# Patient Record
(demographics unavailable — no encounter records)

---

## 2017-05-18 NOTE — EDM.PDOC
ED HPI GENERAL MEDICAL PROBLEM





- General


Chief Complaint: Allergic Reaction


Stated Complaint: POSS REACTION TO CHEMO


Time Seen by Provider: 05/18/17 21:37


Source of Information: Reports: Patient, Family (), RN Notes Reviewed


History Limitations: Reports: No Limitations





- History of Present Illness


INITIAL COMMENTS - FREE TEXT/NARRATIVE: 





The patient states that she self-injects methotrexate weekly for treatment of 

her psoriasis, since February 2017, prescribed per Dr. Putnam from [facility].

  Today around 20:00 she injected her methotrexate in her right inner thigh, 

using a needle about 1.5 inches in length.  Shortly thereafter, she developed a 

burning sensation at the injection site which has since spread down to just 

below her right knee, and she has also noticed a yellow discoloration 

immediately adjacent to the injection site.  No prior similar reactions.





The patient denies having any other problems, including rash, pruritus, dyspnea

, shortness of breath, or angioedema.


  ** Right Upper Leg


Pain Score (Numeric/FACES): 5





- Related Data


 Allergies











Allergy/AdvReac Type Severity Reaction Status Date / Time


 


azathioprine Allergy  Nausea and Verified 05/18/17 21:17





   Vomiting  


 


nickel Allergy  Rash Verified 05/18/17 21:17











Home Meds: 


 Home Meds





FLUoxetine [PROzac] 20 mg PO DAILY 02/09/16 [History]


Acetaminophen/HYDROcodone [Lortab 500-5 MG] 1 tab PO Q6H #20 tablet 04/05/16 [Rx

]


Dicyclomine HCl [Bentyl] 20 mg PO Q6HR #20 tablet 04/05/16 [Rx]


Mesalamine [Lialda] 4 tab PO DAILY 04/05/16 [History]


Acetaminophen/HYDROcodone [Norco 325-5 MG] 1 - 2 tab PO Q6H PRN #15 tablet 11/01 /16 [Rx]


Prednisone [IMW: Prednisone] 30 mg PO DAILY 11/01/16 [History]











Past Medical History


HEENT History: Reports: Impaired Vision


Other HEENT History: wears glasses


Gastrointestinal History: Reports: Inflammatory Bowel Disease (Crohn disease 

and ulcerative colitis)


OB/GYN History: Reports: Pregnancy


Other OB/BYN History: pre menopausal dysphoric disorder, breast mass, 

dysmenorrhea, menorrhagia, STD


Neurological History: Reports: Migraines, Seizure (x 1)


Psychiatric History: Reports: Mood Swings


Endocrine/Metabolic History: Reports: Obesity/BMI 30+


Immunologic History: Reports: Other (See Below) (Psoriasis)





- Past Surgical History


GI Surgical History: Reports: Appendectomy, Colonoscopy, EGD


Female  Surgical History: Reports: Hysterectomy





Social & Family History





- Tobacco Use


Smoking Status *Q: Current Every Day Smoker


Years of Tobacco use: 15


Packs/Tins Daily: 0.5


Second Hand Smoke Exposure: Yes





- Caffeine Use


Caffeine Use: Reports: Coffee





- Alcohol Use


Alcohol Use History: Yes


Alcohol Use Frequency: Socially





- Recreational Drug Use


Recreational Drug Use: Yes


Drug Use in Last 12 Months: Yes


Recreational Drug Type: Reports: Marijuana/Hashish


Recreational Drug Use Frequency: Socially





- Living Situation & Occupation


Living situation: Reports: , with Spouse, with Family (2 kids)


Occupation: Employed (Owns a business)





ED ROS ALLERGIC REACTION





- Review of Systems


Review Of Systems: See Below


Constitutional: Reports: No Symptoms


HEENT: Reports: No Symptoms


Respiratory: Reports: No Symptoms


Cardiovascular: Reports: No Symptoms


Endocrine: Reports: No Symptoms


GI/Abdominal: Reports: No Symptoms


: Reports: No Symptoms


Musculoskeletal: Reports: No Symptoms


Skin: Reports: No Symptoms


Neurological: Reports: No Symptoms


Psychiatric: Reports: No Symptoms


Hematologic/Lymphatic: Reports: No Symptoms


Immunologic: Reports: No Symptoms





ED EXAM GENERAL NO PERIP PULSE





- Physical Exam


Exam: See Below


Exam Limited By: No Limitations


General Appearance: Alert, WD/WN, No Apparent Distress


Eye Exam: Bilateral Eye: Normal Inspection


Ears: Normal External Exam, Hearing Grossly Normal


Nose: Normal Inspection, No Blood


Throat/Mouth: Normal Inspection, Normal Lips, Normal Voice, No Airway Compromise


Head: Atraumatic, Normocephalic


Neck: Normal Inspection, Full Range of Motion


Respiratory/Chest: No Respiratory Distress, Lungs Clear, Normal Breath Sounds, 

No Accessory Muscle Use


Cardiovascular: Normal Peripheral Pulses, Regular Rate, Rhythm, No Gallop, No 

JVD, No Murmur, No Rub


GI/Abdominal: Normal Bowel Sounds, Soft, Non-Tender, No Organomegaly, No 

Distention, No Abnormal Bruit, No Mass


 (Female) Exam: Deferred


Rectal (Female) Exam: Deferred


Back Exam: Normal Inspection, Full Range of Motion, NT


Extremities: Normal Inspection, Normal Range of Motion, No Pedal Edema, Normal 

Capillary Refill, Other (Approximately 2 cm patch of yellow skin on the right 

inner 5 adjacent to a visible injection site.  Nonpalpable.)


Neurological: Alert, Oriented, Normal Cognition, No Motor/Sensory Deficits


Psychiatric: Normal Affect


Skin Exam: Warm, Dry, Intact, Normal Color, No Rash


Lymphatic: No Adenopathy





Course





- Vital Signs


Last Recorded V/S: 


 Last Vital Signs











Temp  36.7 C   05/18/17 21:18


 


Pulse  71   05/18/17 21:18


 


Resp  16   05/18/17 21:18


 


BP  123/71   05/18/17 21:18


 


Pulse Ox  97   05/18/17 21:18














- Re-Assessments/Exams


Free Text/Narrative Re-Assessment/Exam: 





05/18/17 22:24


The patient states that she has a burning sensation to the injection site on 

her right inner thigh that is spreading, as well as a yellow discoloration to 

the injection site.  The medical literature indicates a 3-10% incidence of a 

burning sensation to the injection site her methotrexate, and a less than 1% 

hyperpigmentation, however, I don't believe that the yellowish discoloration is 

hyperpigmentation.  The patient points out, however, that the methotrexate 

itself is yellow, so perhaps this is some skin infiltration.





I find the patient that there is no way that we can remove the methotrexate, 

and I don't believe any treatment is required, as this is not allergic 

reaction.  I would have the patient followup with her prescribing physician, 

Dr. Putnam, in the morning.





Departure





- Departure


Time of Disposition: 22:25


Disposition: Home, Self-Care 01


Condition: good


Clinical Impression: 


 Adverse reaction to antineoplastic and immunosuppressive drugs








- Discharge Information


Instructions:  Drug Allergy, Easy-to-Read


Forms:  ED Department Discharge


Additional Instructions: 


You were seen in the emergency room for a burning sensation to your right inner 

thigh, spreading, and a yellowish discoloration to the injection site of your 

methotrexate.





The literature indicates that a burning sensation can occur in 3-10% of people 

who inject methotrexate.





The literature indicates that less than 1% of people can have hyperpigmentation

, although it is more likely that the yellowish discoloration at your injection 

site is infiltration of the methotrexate into your skin.





These reactions are considered "adverse reactions", not allergic reactions.  No 

medical treatment is required.





We recommend that you notify Dr. Putnam of your experience, in the morning.





If any other problems, please do not hesitate to return to the ER.

## 2018-01-02 NOTE — EDM.PDOC
ED HPI GENERAL MEDICAL PROBLEM





- General


Chief Complaint: Gastrointestinal Problem


Stated Complaint: CROHN'S ISSUES


Time Seen by Provider: 01/02/18 15:12


Source of Information: Reports: Patient


History Limitations: Reports: No Limitations





- History of Present Illness


INITIAL COMMENTS - FREE TEXT/NARRATIVE: 


Patient is a 33-year-old female with a history of Crohn's disease and also 

ulcerative colitis. States she had had a flare of her Crohn's this past Friday 

with heavy bleeding from her rectum. Bleeding has lessened since initial onset. 

She's had one bowel movement today with faint blood present. Of course of 

frequency of defecation is worsen with eating. She's had low appetite since 

onset. She does receive chemotherapy shots weekly along with methotrexate and 

Humira. 


  ** left abdomen/right groin


Pain Score (Numeric/FACES): 5





- Related Data


 Allergies











Allergy/AdvReac Type Severity Reaction Status Date / Time


 


azathioprine Allergy  Nausea and Verified 01/02/18 15:03





   Vomiting  


 


nickel Allergy  Rash Verified 01/02/18 15:03











Home Meds: 


 Home Meds





FLUoxetine [PROzac] 20 mg PO DAILY 02/09/16 [History]


Acetaminophen/HYDROcodone [Norco 325-5 MG] 1 tab PO Q6H PRN #12 tablet 01/02/18 

[Rx]


Adalimumab [Humira Crohn's] 40 mg SQ ASDIRECTED 01/02/18 [History]


Flowbee 1 tab PO ASDIRECTED 01/02/18 [History]


Methotrexate Sodium [Methotrexate] 250 mg SQ WEEKLY 01/02/18 [History]











Past Medical History


HEENT History: Reports: Impaired Vision


Other HEENT History: wears glasses


Respiratory History: Reports: Asthma


Gastrointestinal History: Reports: Inflammatory Bowel Disease, Other (See Below)


Other Gastrointestinal History: chrohn's disease


OB/GYN History: Reports: Pregnancy


Other OB/BYN History: pre menopausal dysphoric disorder, breast mass, 

dysmenorrhea, menorrhagia, STD


Neurological History: Reports: Migraines, Seizure


Psychiatric History: Reports: Mood Swings


Other Psychiatric History: fatigue


Endocrine/Metabolic History: Reports: Obesity/BMI 30+


Immunologic History: Reports: Other (See Below)


Other Immunologic History: is on chem drugs for chrohn's





- Past Surgical History


GI Surgical History: Reports: Appendectomy, Colonoscopy, EGD


Female  Surgical History: Reports: Hysterectomy





Social & Family History





- Family History


Family Medical History: Noncontributory





- Tobacco Use


Smoking Status *Q: Current Every Day Smoker


Years of Tobacco use: 15


Packs/Tins Daily: 0.5


Second Hand Smoke Exposure: Yes





- Caffeine Use


Caffeine Use: Reports: None





- Alcohol Use


Days Per Week of Alcohol Use: 0


Number of Drinks Per Day: 0


Total Drinks Per Week: 0





- Recreational Drug Use


Recreational Drug Use: Yes


Drug Use in Last 12 Months: Yes


Recreational Drug Type: Reports: Marijuana/Hashish


Recreational Drug Use Frequency: Daily





- Living Situation & Occupation


Living situation: Reports: , with Spouse, with Family (2 kids)


Occupation: Employed (Owns a business)





ED ROS GENERAL





- Review of Systems


Review Of Systems: See Below


Constitutional: Reports: Decreased Appetite.  Denies: Fever, Chills


HEENT: Reports: No Symptoms


Respiratory: Reports: No Symptoms


Cardiovascular: Reports: No Symptoms


GI/Abdominal: Reports: Abdominal Pain, Bloody Stool, Constipation, Diarrhea, 

Decreased Appetite.  Denies: Hematemesis, Nausea, Vomiting


: Reports: No Symptoms


Musculoskeletal: Reports: No Symptoms


Neurological: Reports: No Symptoms





ED EXAM, GI/ABD





- Physical Exam


Exam: See Below


Exam Limited By: No Limitations


General Appearance: Alert, WD/WN, No Apparent Distress


Ears: Hearing Grossly Normal


Nose: Normal Inspection


Throat/Mouth: Normal Voice, No Airway Compromise


Head: Atraumatic, Normocephalic


Neck: Normal Inspection, Supple


Respiratory/Chest: No Respiratory Distress, Lungs Clear, Normal Breath Sounds, 

No Accessory Muscle Use, Chest Non-Tender


Cardiovascular: Normal Peripheral Pulses, Regular Rate, Rhythm


GI/Abdominal Exam: Normal Bowel Sounds, Soft, No Organomegaly, Distended, 

Tender (Throughout)


Rectal (Female) Exam: Deferred


Back Exam: Normal Inspection.  No: CVA Tenderness (L), CVA Tenderness (R)


Neurological: Alert, Oriented, CN II-XII Intact, Normal Cognition, No Motor/

Sensory Deficits


Psychiatric: Normal Affect, Normal Mood


Skin Exam: Warm, Dry, Intact, Normal Color, No Rash





Course





- Vital Signs


Last Recorded V/S: 


 Last Vital Signs











Temp  97.5 F   01/02/18 14:52


 


Pulse  57 L  01/02/18 18:30


 


Resp  16   01/02/18 18:30


 


BP  107/72   01/02/18 18:30


 


Pulse Ox  96   01/02/18 18:30














- Orders/Labs/Meds


Orders: 


 Active Orders 24 hr











 Category Date Time Status


 


 Peripheral IV Care [RC] .AS DIRECTED Care  01/02/18 15:28 Active


 


 Peripheral IV Insertion Adult [OM.PC] Stat Oth  01/02/18 15:27 Ordered











Labs: 


 Laboratory Tests











  01/02/18 01/02/18 01/02/18 Range/Units





  16:00 16:00 17:15 


 


WBC  6.66    (3.98-10.04)  K/mm3


 


RBC  4.22    (3.98-5.22)  M/mm3


 


Hgb  13.3    (11.2-15.7)  gm/L


 


Hct  38.8    (34.1-44.9)  %


 


MCV  91.9    (79.4-94.8)  fl


 


MCH  31.5    (25.6-32.2)  pg


 


MCHC  34.3    (32.2-35.5)  g/dl


 


RDW Std Deviation  44.3    (36.4-46.3)  fL


 


Plt Count  181 L    (182-369)  K/mm3


 


MPV  10.6    (9.4-12.3)  fl


 


Neut % (Auto)  52.0    (34.0-71.1)  %


 


Lymph % (Auto)  39.6    (19.3-51.7)  %


 


Mono % (Auto)  6.5    (4.7-12.5)  %


 


Eos % (Auto)  1.5    (0.7-5.8)  


 


Baso % (Auto)  0.2    (0.1-1.2)  %


 


Neut # (Auto)  3.47    (1.56-6.13)  K/mm3


 


Lymph # (Auto)  2.64    (1.18-3.74)  K/mm3


 


Mono # (Auto)  0.43 H    (0.24-0.36)  K/mm3


 


Eos # (Auto)  0.10    (0.04-0.36)  K/mm3


 


Baso # (Auto)  0.01    (0.01-0.08)  K/mm3


 


Sodium   139   (136-145)  mEq/L


 


Potassium   4.1   (3.5-5.1)  mEq/L


 


Chloride   106   ()  mEq/L


 


Carbon Dioxide   28   (21-32)  mEq/L


 


Anion Gap   9.1   (5-15)  


 


BUN   10   (7-18)  mg/dL


 


Creatinine   0.7   (0.55-1.02)  mg/dL


 


Est Cr Clr Drug Dosing   111.16   mL/min


 


Estimated GFR (MDRD)   > 60   (>60)  mL/min


 


BUN/Creatinine Ratio   14.3   (14-18)  


 


Glucose   88   ()  mg/dL


 


Calcium   8.7   (8.5-10.1)  mg/dL


 


Total Bilirubin   0.5   (0.2-1.0)  mg/dL


 


AST   29   (15-37)  U/L


 


ALT   44   (14-59)  U/L


 


Alkaline Phosphatase   36 L   ()  U/L


 


C-Reactive Protein   < 0.2   (<1.0)  mg/dL


 


Total Protein   6.6   (6.4-8.2)  g/dl


 


Albumin   3.6   (3.4-5.0)  g/dl


 


Globulin   3.0   gm/dL


 


Albumin/Globulin Ratio   1.2   (1-2)  


 


Lipase   190   ()  U/L


 


Urine Color    Yellow  (Yellow)  


 


Urine Appearance    Clear  (Clear)  


 


Urine pH    7.0  (5.0-8.0)  


 


Ur Specific Gravity    1.020  (1.005-1.030)  


 


Urine Protein    Negative  (Negative)  


 


Urine Glucose (UA)    Negative  (Negative)  


 


Urine Ketones    Negative  (Negative)  


 


Urine Occult Blood    Negative  (Negative)  


 


Urine Nitrite    Negative  (Negative)  


 


Urine Bilirubin    Negative  (Negative)  


 


Urine Urobilinogen    0.2  (0.2-1.0)  


 


Ur Leukocyte Esterase    Negative  (Negative)  


 


Urine RBC    0-5  (0-5)  /hpf


 


Urine WBC    0-5  (0-5)  /hpf


 


Ur Epithelial Cells    0-5  (0-5)  /hpf


 


Amorphous Sediment    Few H  (NOT SEEN)  /hpf


 


Urine Bacteria    Few  (FEW)  /hpf


 


Urine Mucus    Not seen  (FEW)  /hpf











Meds: 


Medications














Discontinued Medications














Generic Name Dose Route Start Last Admin





  Trade Name Freq  PRN Reason Stop Dose Admin


 


Diatrizoate Meglum/Diatrizoate Sod  90 ml  01/02/18 17:19  01/02/18 17:43





  Gastrografin 37%  PO  01/02/18 17:20  90 ml





  ONETIME ONE   Administration


 


Hydromorphone HCl  1 mg  01/02/18 15:28  01/02/18 16:04





  Dilaudid  IVPUSH  01/02/18 15:29  1 mg





  ONETIME ONE   Administration


 


Hydromorphone HCl  0.5 mg  01/02/18 18:11  01/02/18 18:15





  Dilaudid  IVPUSH  01/02/18 18:12  0.5 mg





  ONETIME ONE   Administration


 


Sodium Chloride  1,000 mls @ 250 mls/hr  01/02/18 15:30  01/02/18 16:06





  Normal Saline  IV   250 mls/hr





  ASDIRECTED MIA   Administration


 


Iopamidol  125 ml  01/02/18 17:19  01/02/18 17:44





  Isovue-300 (61%)  IVPUSH  01/02/18 17:20  125 ml





  ONETIME ONE   Administration


 


Ondansetron HCl  4 mg  01/02/18 15:28  01/02/18 16:02





  Zofran  IVPUSH  01/02/18 15:29  4 mg





  ONETIME ONE   Administration


 


Sodium Chloride  10 ml  01/02/18 15:27  01/02/18 17:44





  Saline Flush  FLUSH   10 ml





  ASDIRECTED PRN   Administration





  Keep Vein Open   














- Re-Assessments/Exams


Free Text/Narrative Re-Assessment/Exam: 








Chest x-ray revealed no acute findings. 





X-ray of the abdomen revealed a few air-fluid levels. Nonspecific stool 

pattern. Due to patient's pain on examination and history of blood in her stool 

with ulcerative and Crohn's disease. . Will go ahead and obtain CT of the 

abdomen and pelvis with oral and IV contrast.Patient request CT of the abdomen 

and pelvis





Labs pending. 





Labs reviewed: CBC essentially normal. Chemistry panel essentially normal. CRP 

less than 0.2. Lipase 190. UA negative for infection. 





CT abdomen and pelvis with IV contrast impression: No acute findings.





Discuss results of labs and also CT study with patient. Pain was under control 

but coming back now. Will order Dilaudid 0.5 mg IVP.  





Will discharge home with instructions as documented.











Departure





- Departure


Time of Disposition: 18:11


Disposition: Home, Self-Care 01


Condition: Good


Clinical Impression: 


 Abdominal pain, Blood per rectum





Crohns disease


Qualifiers:


 Gastrointestinal tract location: unspecified location Digestive disease 

complication type: unspecified complication Qualified Code(s): K50.919 - Crohn'

s disease, unspecified, with unspecified complications





Ulcerative (chronic) enterocolitis


Qualifiers:


 Digestive disease complication type: unspecified complication Qualified Code(s)

: K51.019 - Ulcerative (chronic) pancolitis with unspecified complications








- Discharge Information


Prescriptions: 


Acetaminophen/HYDROcodone [Norco 325-5 MG] 1 tab PO Q6H PRN #12 tablet


 PRN Reason: Pain (Severe 7-10)


Instructions:  Abdominal Pain, Adult, Easy-to-Read


Referrals: 


Kristen Allen NP [Primary Care Provider] - 


Forms:  ED Department Discharge, ED Return to Work/School Form


Additional Instructions: 


As discussed CT of the abdomen and pelvis along with labs did not reveal any 

concerning findings. For pain take Norco one tab every 6 hours as needed for 

severe pain. Push the fluids. Eat a low residue diet. Follow-up with primary 

care provider first part of next week for reevaluation as needed. Return to the 

ED if you develop any new or worsening symptoms.





- My Orders


Last 24 Hours: 


My Active Orders





01/02/18 15:27


Peripheral IV Insertion Adult [OM.PC] Stat 





01/02/18 15:28


Peripheral IV Care [RC] .AS DIRECTED 














- Assessment/Plan


Last 24 Hours: 


My Active Orders





01/02/18 15:27


Peripheral IV Insertion Adult [OM.PC] Stat 





01/02/18 15:28


Peripheral IV Care [RC] .AS DIRECTED

## 2018-01-02 NOTE — CR
Abdominal series: Supine and upright views of the abdomen were 

obtained as well as frontal view of the chest.

 

Comparison: Previous abdominal x-ray of 11/01/16, no previous chest 

x-ray.

 

Heart size and mediastinum are normal.  Lungs are clear.  No free air 

is seen.  Bowel gas pattern appears normal.  Calcifications are seen 

within the pelvis most likely representing phleboliths.  No soft 

tissue abnormality is seen.

 

Impression:

1.  Nonspecific abdominal series.  

 

Diagnostic code #1

## 2018-01-03 NOTE — CT
CT abdomen and pelvis

 

Technique: Multiple axial sections were obtained from above the dome 

of the diaphragm inferiorly through the pubic symphysis.  Intravenous 

and oral contrast has been given.  Delayed images were also obtained 

through the bladder.

 

Comparison: Prior abdominal x-ray of 11/01/16 is available as well as 

previous abdominal and pelvic CT of 12/17/15.

 

Findings: Visualized lung bases show nothing acute.  Liver shows no 

focal parenchymal abnormality.  Gallbladder contains no calcified 

gallstones.  Spleen appears within normal limits.  Soft tissue nodule 

is identified next to the spleen compatible with accessory splenic 

tissue.  Adrenal glands show no nodule.  Pancreas is within normal 

limits.

 

Kidneys show symmetric contrast enhancement without hydronephrosis or 

mass.  No pelvic mass or adenopathy is seen.  Appendix not visualized 

with certainty.  Slight increased stool noted within the right colon. 

 No free fluid or inflammatory change is seen.  No bowel dilatation is

 seen.

 

Delayed images show contrast within the bladder and within the distal 

ureters.

 

Bone window settings were reviewed which appear within normal limits 

for the patient's age.

 

Impression:

1.  Nothing acute is seen on CT study of the abdomen and pelvis.  

 

Diagnostic code #1

 

I agree with preliminary report issued by [facility] ([facility] report finalized 

on 01/02/18, 7:01 PM Central Time)

## 2018-07-13 NOTE — EDM.PDOC
ED HPI GENERAL MEDICAL PROBLEM





- General


Chief Complaint: Abdominal Pain


Stated Complaint: CHRONES FLAREUP


Time Seen by Provider: 07/13/18 15:43


Source of Information: Reports: Patient


History Limitations: Reports: No Limitations





- History of Present Illness


INITIAL COMMENTS - FREE TEXT/NARRATIVE: 





The patient presents with a Crohn's flair.  This stared a few days ago.  She 

has generalized abdominal pain with some bloody diarrhea.  She just found out 

she is allergic to Humera and she is being switched to another biologic August 3rd.  She has no fever, chills, cough, chest pain, or dysuria.  She has nausea 

and no vomiting.  She has never needed surgery yet.


Onset: Gradual


Duration: Day(s):


Location: Reports: Abdomen


Quality: Reports: Ache


Severity: Moderate


Improves with: Reports: None


Worsens with: Reports: None


Associated Symptoms: Reports: Nausea/Vomiting.  Denies: Chest Pain, Cough, Fever

/Chills, Headaches, Shortness of Breath


  ** Abdomen


Pain Score (Numeric/FACES): 4





- Related Data


 Allergies











Allergy/AdvReac Type Severity Reaction Status Date / Time


 


adalimumab [From Humira] Allergy  Itching Verified 07/13/18 15:45


 


azathioprine Allergy  Nausea and Verified 01/02/18 15:03





   Vomiting  


 


nickel Allergy  Rash Verified 01/02/18 15:03











Home Meds: 


 Home Meds





FLUoxetine [PROzac] 20 mg PO DAILY 02/09/16 [History]


Methotrexate 5 tab PO ASDIRECTED 07/13/18 [History]











Past Medical History


HEENT History: Reports: Impaired Vision


Other HEENT History: wears glasses


Respiratory History: Reports: Asthma


Gastrointestinal History: Reports: Inflammatory Bowel Disease, Other (See Below)


Other Gastrointestinal History: chrohn's disease


OB/GYN History: Reports: Pregnancy


Other OB/GYN History: pre menopausal dysphoric disorder, breast mass, 

dysmenorrhea, menorrhagia, STD


Neurological History: Reports: Migraines, Seizure


Psychiatric History: Reports: Mood Swings


Other Psychiatric History: fatigue


Endocrine/Metabolic History: Reports: Obesity/BMI 30+


Immunologic History: Reports: Other (See Below)


Other Immunologic History: is on chem drugs for chrohn's





- Past Surgical History


GI Surgical History: Reports: Appendectomy, Colonoscopy, EGD


Female  Surgical History: Reports: Hysterectomy





Social & Family History





- Family History


Family Medical History: Noncontributory





- Tobacco Use


Smoking Status *Q: Current Every Day Smoker


Years of Tobacco use: 20


Packs/Tins Daily: 0.5





- Caffeine Use


Caffeine Use: Reports: Coffee, Soda





- Recreational Drug Use


Recreational Drug Type: Reports: Marijuana/Hashish





- Living Situation & Occupation


Living situation: Reports: , with Spouse, with Family (2 kids)


Occupation: Employed (Owns a business)





ED ROS GENERAL





- Review of Systems


Review Of Systems: See Below


Constitutional: Reports: No Symptoms


HEENT: Reports: No Symptoms


Respiratory: Reports: No Symptoms


Cardiovascular: Reports: No Symptoms


Endocrine: Reports: No Symptoms


GI/Abdominal: Reports: Abdominal Pain, Bloody Stool, Nausea.  Denies: Vomiting


: Reports: No Symptoms


Musculoskeletal: Reports: No Symptoms


Neurological: Reports: No Symptoms





ED EXAM, GI/ABD





- Physical Exam


Exam: See Below


Exam Limited By: No Limitations


General Appearance: Alert, No Apparent Distress


Ears: Normal External Exam


Nose: Normal Inspection


Head: Atraumatic, Normocephalic


Neck: Normal Inspection


Respiratory/Chest: No Respiratory Distress, Lungs Clear, Normal Breath Sounds


Cardiovascular: Regular Rate, Rhythm, No Edema, No Murmur


GI/Abdominal Exam: Soft, No Organomegaly, No Mass, Tender (Moderate generalized 

tenderness)


Back Exam: Normal Inspection


Extremities: Normal Inspection





Course





- Vital Signs


Last Recorded V/S: 





 Last Vital Signs











Temp  98.5 F   07/13/18 15:52


 


Pulse  62   07/13/18 15:52


 


Resp  20   07/13/18 15:52


 


BP  121/79   07/13/18 15:52


 


Pulse Ox  96   07/13/18 15:52














- Orders/Labs/Meds


Orders: 





 Active Orders 24 hr











 Category Date Time Status


 


 Peripheral IV Care [RC] .AS DIRECTED Care  07/13/18 15:59 Active


 


 Abdomen Series w Chest 1V [CR] Stat Exams  07/13/18 15:59 Taken


 


 UA W/MICROSCOPIC [URIN] Stat Lab  07/13/18 15:59 Ordered


 


 Sodium Chloride 0.9% [Normal Saline] 1,000 ml Med  07/13/18 18:14 Active





 IV ONETIME   


 


 Sodium Chloride 0.9% [Saline Flush] Med  07/13/18 15:59 Active





 10 ml FLUSH ASDIRECTED PRN   


 


 ED Antiemetic Medication Reflex [OM.PC] Stat Oth  07/13/18 15:59 Ordered


 


 Peripheral IV Insertion Adult [OM.PC] Stat Oth  07/13/18 15:59 Ordered








 Medication Orders





Sodium Chloride (Normal Saline)  1,000 mls @ 1,000 mls/hr IV ONETIME ONE


   Stop: 07/13/18 19:13


Sodium Chloride (Saline Flush)  10 ml FLUSH ASDIRECTED PRN


   PRN Reason: Keep Vein Open


   Last Admin: 07/13/18 16:25  Dose: 10 ml








Labs: 





 Laboratory Tests











  07/13/18 07/13/18 07/13/18 Range/Units





  16:25 16:25 16:25 


 


WBC  5.62    (3.98-10.04)  K/mm3


 


RBC  3.86 L    (3.98-5.22)  M/mm3


 


Hgb  11.7    (11.2-15.7)  gm/L


 


Hct  34.6    (34.1-44.9)  %


 


MCV  89.6    (79.4-94.8)  fl


 


MCH  30.3    (25.6-32.2)  pg


 


MCHC  33.8    (32.2-35.5)  g/dl


 


RDW Std Deviation  41.9    (36.4-46.3)  fL


 


Plt Count  204    (182-369)  K/mm3


 


MPV  10.6    (9.4-12.3)  fl


 


Neut % (Auto)  48.0    (34.0-71.1)  %


 


Lymph % (Auto)  40.4    (19.3-51.7)  %


 


Mono % (Auto)  9.8    (4.7-12.5)  %


 


Eos % (Auto)  1.4    (0.7-5.8)  


 


Baso % (Auto)  0.4    (0.1-1.2)  %


 


Neut # (Auto)  2.70    (1.56-6.13)  K/mm3


 


Lymph # (Auto)  2.27    (1.18-3.74)  K/mm3


 


Mono # (Auto)  0.55 H    (0.24-0.36)  K/mm3


 


Eos # (Auto)  0.08    (0.04-0.36)  K/mm3


 


Baso # (Auto)  0.02    (0.01-0.08)  K/mm3


 


Sodium   141   (136-145)  mEq/L


 


Potassium   3.6   (3.5-5.1)  mEq/L


 


Chloride   107   ()  mEq/L


 


Carbon Dioxide   26   (21-32)  mEq/L


 


Anion Gap   11.6   (5-15)  


 


BUN   11   (7-18)  mg/dL


 


Creatinine   0.8   (0.55-1.02)  mg/dL


 


Est Cr Clr Drug Dosing   96.36   mL/min


 


Estimated GFR (MDRD)   > 60   (>60)  mL/min


 


BUN/Creatinine Ratio   13.8 L   (14-18)  


 


Glucose   81   ()  mg/dL


 


Calcium   8.5   (8.5-10.1)  mg/dL


 


Total Bilirubin   0.4   (0.2-1.0)  mg/dL


 


AST   22   (15-37)  U/L


 


ALT   19   (14-59)  U/L


 


Alkaline Phosphatase   34 L   ()  U/L


 


Total Protein   6.6   (6.4-8.2)  g/dl


 


Albumin   3.7   (3.4-5.0)  g/dl


 


Globulin   2.9   gm/dL


 


Albumin/Globulin Ratio   1.3   (1-2)  


 


Lipase   109   ()  U/L


 


HCG, Qual    Negative  (NEGATIVE)  











Meds: 





Medications











Generic Name Dose Route Start Last Admin





  Trade Name Freq  PRN Reason Stop Dose Admin


 


Sodium Chloride  1,000 mls @ 1,000 mls/hr  07/13/18 18:14  





  Normal Saline  IV  07/13/18 19:13  





  ONETIME ONE   





     





     





     





     


 


Sodium Chloride  10 ml  07/13/18 15:59  07/13/18 16:25





  Saline Flush  FLUSH   10 ml





  ASDIRECTED PRN   Administration





  Keep Vein Open   





     





     





     














Discontinued Medications














Generic Name Dose Route Start Last Admin





  Trade Name Freq  PRN Reason Stop Dose Admin


 


Sodium Chloride  1,000 mls @ 1,000 mls/hr  07/13/18 15:59  07/13/18 16:29





  Normal Saline  IV  07/13/18 16:58  1,000 mls/hr





  .BOLUS STA   Administration





     





     





     





     


 


Ondansetron HCl  4 mg  07/13/18 15:59  07/13/18 16:29





  Zofran  IVPUSH  07/13/18 16:00  4 mg





  ONETIME ONE   Administration





     





     





     





     














- Re-Assessments/Exams


Free Text/Narrative Re-Assessment/Exam: 





07/13/18 18:47


I ordered an IV NS 1L bolus, zofran 4mg IV, labs, and an abdominal x-ray.  Her 

CBC and CMP look good.  Her x-ray shows a nonspecific bowel gas pattern.  She 

feels better but she would like another liter of fluids.  I have ordered that 

and I will discharge her home.





Departure





- Departure


Time of Disposition: 18:50


Disposition: Home, Self-Care 01


Condition: Good


Clinical Impression: 


Crohn disease


Qualifiers:


 Gastrointestinal tract location: unspecified location Digestive disease 

complication type: unspecified complication Qualified Code(s): K50.919 - Crohn'

s disease, unspecified, with unspecified complications








- Discharge Information


*PRESCRIPTION DRUG MONITORING PROGRAM REVIEWED*: Not Applicable


*COPY OF PRESCRIPTION DRUG MONITORING REPORT IN PATIENT OSCAR: Not Applicable


Referrals: 


Stephen Mullen MD [Primary Care Provider] - 


Additional Instructions: 


Take your medicine as prescribed. Follow up with Dr Mullen as scheduled.  Drink 

plenty of fluids.  Please return if you are worse.





- My Orders


Last 24 Hours: 





My Active Orders





07/13/18 15:59


Peripheral IV Care [RC] .AS DIRECTED 


Abdomen Series w Chest 1V [CR] Stat 


UA W/MICROSCOPIC [URIN] Stat 


Sodium Chloride 0.9% [Saline Flush]   10 ml FLUSH ASDIRECTED PRN 


ED Antiemetic Medication Reflex [OM.PC] Stat 


Peripheral IV Insertion Adult [OM.PC] Stat 





07/13/18 18:14


Sodium Chloride 0.9% [Normal Saline] 1,000 ml IV ONETIME 














- Assessment/Plan


Last 24 Hours: 





My Active Orders





07/13/18 15:59


Peripheral IV Care [RC] .AS DIRECTED 


Abdomen Series w Chest 1V [CR] Stat 


UA W/MICROSCOPIC [URIN] Stat 


Sodium Chloride 0.9% [Saline Flush]   10 ml FLUSH ASDIRECTED PRN 


ED Antiemetic Medication Reflex [OM.PC] Stat 


Peripheral IV Insertion Adult [OM.PC] Stat 





07/13/18 18:14


Sodium Chloride 0.9% [Normal Saline] 1,000 ml IV ONETIME

## 2018-07-16 NOTE — CR
Abdominal series: Supine and upright views of the abdomen were 

obtained as well as frontal view of the chest.

 

Comparison: Previous abdominal x-ray of previous abdominal series of 

01/02/18.

 

Heart size and mediastinum are normal.  Lungs are clear.  No free air 

is seen.  Bowel gas pattern is normal.  Calcifications are seen within

 the pelvis which are compatible with phleboliths.  No other abnormal 

calcifications are seen.  No soft tissue abnormality is identified.  

Bony structures are unremarkable.

 

Impression:

1.  Unremarkable abdominal series with incidental findings as noted 

above.

 

Diagnostic code #2

## 2019-06-11 NOTE — PCM.OPNOTE
- General Post-Op/Procedure Note


Date of Surgery/Procedure: 06/11/19


Operative Procedure(s): Laparoscopy, right salpingectomy, lysis of pelvic 

adhesions


Findings: 





Right hydrosalpinx. Uterus and cervix surgically absent. Left ovary appeared 

normal however was adhered to the left pelvic sidewall. Right ovary appeared 

normal. The patient's ascending colon was somewhat lightly adherent to the 

right abdominal sidewall.


Pre Op Diagnosis: 1. Right lower quadrant pelvic pain.  2. Right hydrosalpinx


Post-Op Diagnosis: Same with pelvic adhesions in the right abdominal pelvic 

area.


Other Anesthesia Type: Marcaine 0.5%5-10 mL local


Primary Surgeon: Gregg Eldridge


Secondary Surgeon: Ismael Haddad


Anesthesia Provider: Jaymie Loza


Assistant: Araceli Buchanan


Assistant: Deonte Hoffman


Reason Assistant Was Necessary: 





Retraction, assistance, patient safety, quality of care.


Pathology: 





Right fallopian tube


Fluid Replacement, Intraop: 700


Output, Urine Amount: 150


EBL in mLs: 5


Surgical Drain/Tube Type: Nephrostomy


Drain/Tube Comments:: Indwelling bladder catheterduring surgery onlyremoved 

at the end of the case.


Complications: None


Condition: Good


Free Text/Narrative:: 





Surgery duration: 24 minutes








Procedure: The patient was taken to the operating room and placed in supine 

position on the operative table. She had sequential compression stockings in 

place for DVT prophylaxis and had been given 2 g of Ancef IV for infection 

prophylaxis. She was administered general endotracheal anesthesia. After 

administration of anesthesia the patient was placed in dorsal lithotomy 

position and prepped and draped in usual fashion. An indwelling bladder 

catheter was placed. Patient is status post hysterectomy. 





Infraumbilical incision site and suprapubic site were then infiltrated with 

approximately 3-5 mL of Marcaine 0.5%. 5 mm incisions were made in these areas. 

Verres needle was placed in the infraumbilical incision site and 

pneumoperitoneum was established was in 3 L of CO2. The laparoscopic sleeve was 

then placed as was the scope. Under direct visualization the suprapubic site 

was developed with a 5 mm port. Pelvis was evaluated findings as above. Uterus, 

cervix, left fallopian tube and part of the right fallopian tube were found to 

be surgically absent. The right fallopian tube remnant was found to be dilated 

consistent with hydrosalpinx. Ovaries were functional in appearance. Left ovary 

somewhat adherent to left pelvic sidewall. This was not manipulated as patient 

is having no symptoms referable to this area. Patient had adhesions in the area 

of the right area of her appendectomy which was done during her private last 

pregnancy. The liver edge was unremarkable and the gallbladder was visualized 

and found to be without inflammation. Anatomy otherwise was unremarkable.





A third port in the right lower quadrant was then developed under direct 

visualization. Marcaine used for anesthesia. The right fallopian tube was 

elevated and mesosalpinx was then crossclamped and pedicle developed using the 

Enseal vessel closure system. No significant bleeding was noted. At this time 

the procedure was discontinued. Lower 2 port sites removed under direct 

visualization. No peritoneum was reversed and the upper sleeve was removed. No 

bleeding was encountered. Hemostasis was confirmed.





The lower sleeve having been removed under direct visualization. The incisions 

were closed with single subcuticular interrupted suture of 3-0 Monocryl. The 

incisions were further approximated with Dermabond skin glue. The patient was 

awakened from general endotracheal anesthesia. She left the operating room in 

good condition.

## 2019-06-11 NOTE — PCM.POSTAN
POST ANESTHESIA ASSESSMENT





- MENTAL STATUS


Mental Status: Alert





- VITAL SIGNS


Pulse Rate: 97


SaO2: 97 (2LPM nasal cannula)


Resp Rate: 17


Blood Pressure: 119/81


Temperature: 36.8 C





- RESPIRATORY


Respiratory Status: Respiratory Rate WNL, Airway Patent, O2 Saturation Stable, 

Supplemental Oxygen





- CARDIOVASCULAR


CV Status: Pulse Rate WNL, Blood Pressure Stable





- GASTROINTESTINAL


GI Status: No Symptoms





- POST OP HYDRATION


Hydration Status: Adequate & Stable

## 2023-06-13 NOTE — PCM.PREANE
Preanesthetic Assessment





- Anesthesia/Transfusion/Family Hx


Anesthesia History: Prior Anesthesia Without Reaction


Family History of Anesthesia Reaction: No


Transfusion History: No Prior Transfusion(s)


Intubation History: Unknown





- Review of Systems


General: No Symptoms


Pulmonary: No Symptoms (Exercise induced asthma/smoker: less than 1ppd times 15 

years quit smoking in April, but currently vapes and smokes marijuana three 

times a day for nausea, and pain.), Cough, Sputum


Cardiovascular: No Symptoms, Palpitations (on occasion.), Lightheadedness (

positonal changes)


Gastrointestinal: No Symptoms (GERD/History of Crohn's Disease-rectal bleeding)

, Abdominal Pain (2-3/10), Diarrhea


Neurological: No Symptoms (lower back pain noted at 3-4/10), Headache (History 

of migraines), Seizure (History of last one 16 years ago (at the age 18))


Other: Reports: Easy Bruising, Depression, Anxiety





- Physical Assessment


NPO Status Date: 06/10/19


NPO Status Time: 23:00


Pulse: 73


O2 Sat by Pulse Oximetry: 97


Respiratory Rate: 16


Blood Pressure: 114/63


Temperature: 36.6 C


Height: 1.68 m


Weight: 91.626 kg


ASA Class: 2


Mental Status: Alert & Oriented x3


Dentition: Reports: Normal Dentition, Implants (front tooth upper/solid), Caries


Thyro-Mental Finger Breadths: 3


Mouth Opening Finger Breadths: 3


ROM/Head Extension: Full


Lungs: Clear to Auscultation, Normal Respiratory Effort


Cardiovascular: Regular Rate, Regular Rhythm, No Murmurs





- Allergies


Allergies/Adverse Reactions: 


 Allergies











Allergy/AdvReac Type Severity Reaction Status Date / Time


 


adalimumab [From Humira] Allergy  Itching Verified 06/10/19 14:26


 


azathioprine Allergy  Nausea and Verified 06/10/19 14:26





   Vomiting  


 


nickel Allergy  Rash Verified 06/10/19 14:26














- Anesthesia Plan


Pre-Op Medication Ordered: None





- Acknowledgements


Anesthesia Type Planned: General Anesthesia


Pt an Appropriate Candidate for the Planned Anesthesia: Yes


Alternatives and Risks of Anesthesia Discussed w Pt/Guardian: Yes


Pt/Guardian Understands and Agrees with Anesthesia Plan: Yes





PreAnesthesia Questionnaire


HEENT History: Reports: Impaired Vision


Other HEENT History: wears glasses


Cardiovascular History: Reports: None


Respiratory History: Reports: Asthma


Gastrointestinal History: Reports: Inflammatory Bowel Disease, Other (See Below)


Other Gastrointestinal History: crohn's disease, bloating, abdomnial pain, acid 

reflux, diarrhea, LUQ pain, hematochezia, ulcerative colitis


Genitourinary History: Reports: Other (See Below)


Other Genitourinary History: hydrosalpingx, pelvic mass, PMDD, left breast mass

, dysmenorrhea


OB/GYN History: Reports: Pregnancy


Other OB/BYN History: pre menopausal dysphoric disorder, breast mass, 

dysmenorrhea, menorrhagia, STD


Musculoskeletal History: Reports: None


Neurological History: Reports: Migraines, Seizure


Psychiatric History: Reports: Mood Swings


Other Psychiatric History: fatigue


Endocrine/Metabolic History: Reports: Obesity/BMI 30+


Hematologic History: Reports: None


Immunologic History: Reports: Other (See Below)


Other Immunologic History: is on chem drugs for chrohn's


Oncologic (Cancer) History: Reports: None


Dermatologic History: Reports: None





- Past Surgical History


Head Surgeries/Procedures: Reports: None


Cardiovascular Surgical History: Reports: None


Respiratory Surgical History: Reports: None


GI Surgical History: Reports: Appendectomy, Colonoscopy, EGD


Female  Surgical History: Reports: Hysterectomy


Endocrine Surgical History: Reports: None


Neurological Surgical History: Reports: None


Musculoskeletal Surgical History: Reports: None


Oncologic Surgical History: Reports: None


Dermatological Surgical History: Reports: None





- SUBSTANCE USE


Smoking Status *Q: Current Every Day Smoker


Recreational Drug Use History: Yes


Recreational Drug Type: Reports: Marijuana/Hashish





- HOME MEDS


Home Medications: 


 Home Meds





FLUoxetine [PROzac] 20 mg PO DAILY 02/09/16 [History]


Methotrexate 20 mg PO AGOSTO 07/13/18 [History]


Ustekinumab [Stelara] 90 mg SQ Q56D 09/08/18 [History]


Albuterol [Ventolin HFA] 1 - 2 puff INH Q4H PRN 06/10/19 [History]


Cyanocobalamin/Folic AC/Vit B6 [Folbee] 1 tab PO MOTUWETHFRSA 06/10/19 [History]


Famotidine 40 mg PO DAILY 06/10/19 [History]


Pantoprazole Sodium 40 mg PO DAILY 06/10/19 [History]











- CURRENT (IN HOUSE) MEDS


Current Meds: 





 Current Medications





Albuterol (Proventil Neb Soln)  2.5 mg NEB ONETIME PRN


   PRN Reason: bronchodilation


   Stop: 06/11/19 18:00


Lactated Ringer's (Ringers, Lactated)  1,000 mls @ 125 mls/hr IV ASDIRECTED MIA


   Stop: 06/11/19 23:00


Lidocaine/Sodium Bicarbonate (Buffered Lidocaine 1% In Ns 8.4%)  0.25 ml IDERM 

ONETIME PRN


   PRN Reason: Prior to IV Start


   Stop: 06/11/19 18:00


Sodium Chloride (Saline Flush)  10 ml FLUSH ASDIRECTED PRN


   PRN Reason: Keep Vein Open


   Stop: 06/11/19 18:00





Discontinued Medications





Cefazolin Sodium (Ancef) Confirm Administered Dose 2 gm .ROUTE .STK-MED ONE


   Stop: 06/11/19 07:37


Dexamethasone (Dexamethasone) Confirm Administered Dose 4 mg .ROUTE .STK-MED ONE


   Stop: 06/11/19 07:37


Fentanyl (Sublimaze) Confirm Administered Dose 250 mcg .ROUTE .STK-MED ONE


   Stop: 06/11/19 07:39


Hydromorphone HCl (Dilaudid) Confirm Administered Dose 0.5 mg .ROUTE .STK-MED 

ONE


   Stop: 06/11/19 07:38


Lidocaine HCl (Xylocaine-Mpf 1%) Confirm Administered Dose 6 mls @ as directed 

.ROUTE .STK-MED ONE


   Stop: 06/11/19 07:37


Lactated Ringer's (Ringers, Lactated) Confirm Administered Dose 1,000 mls @ as 

directed .ROUTE .STK-MED ONE


   Stop: 06/11/19 07:37


Ketorolac Tromethamine (Toradol) Confirm Administered Dose 30 mg .ROUTE .STK-

MED ONE


   Stop: 06/11/19 07:37


Midazolam HCl (Versed 1 Mg/Ml) Confirm Administered Dose 2 mg .ROUTE .STK-MED 

ONE


   Stop: 06/11/19 07:39


Ondansetron HCl (Zofran) Confirm Administered Dose 4 mg .ROUTE .STK-MED ONE


   Stop: 06/11/19 07:37


Propofol (Diprivan  20 Ml) Confirm Administered Dose 200 mg .ROUTE .STK-MED ONE


   Stop: 06/11/19 07:38


Rocuronium Bromide (Zemuron) Confirm Administered Dose 50 mg .ROUTE .STK-MED ONE


   Stop: 06/11/19 07:37 What Type Of Note Output Would You Prefer (Optional)?: Bullet Format How Severe Is Your Rash?: mild Is This A New Presentation, Or A Follow-Up?: Rash